# Patient Record
Sex: FEMALE | Race: OTHER | HISPANIC OR LATINO | URBAN - METROPOLITAN AREA
[De-identification: names, ages, dates, MRNs, and addresses within clinical notes are randomized per-mention and may not be internally consistent; named-entity substitution may affect disease eponyms.]

---

## 2021-05-16 ENCOUNTER — EMERGENCY (EMERGENCY)
Facility: HOSPITAL | Age: 21
LOS: 1 days | Discharge: HOME | End: 2021-05-16
Admitting: EMERGENCY MEDICINE
Payer: MEDICAID

## 2021-05-16 VITALS
HEART RATE: 111 BPM | OXYGEN SATURATION: 100 % | TEMPERATURE: 99 F | SYSTOLIC BLOOD PRESSURE: 117 MMHG | RESPIRATION RATE: 16 BRPM | DIASTOLIC BLOOD PRESSURE: 81 MMHG

## 2021-05-16 PROCEDURE — 99284 EMERGENCY DEPT VISIT MOD MDM: CPT

## 2021-05-16 RX ORDER — IBUPROFEN 200 MG
1 TABLET ORAL
Qty: 30 | Refills: 0
Start: 2021-05-16

## 2021-05-16 RX ORDER — KETOROLAC TROMETHAMINE 30 MG/ML
30 SYRINGE (ML) INJECTION ONCE
Refills: 0 | Status: DISCONTINUED | OUTPATIENT
Start: 2021-05-16 | End: 2021-05-16

## 2021-05-16 RX ORDER — CIPROFLOXACIN AND DEXAMETHASONE 3; 1 MG/ML; MG/ML
4 SUSPENSION/ DROPS AURICULAR (OTIC)
Qty: 1 | Refills: 0
Start: 2021-05-16 | End: 2021-05-22

## 2021-05-16 RX ADMIN — Medication 30 MILLIGRAM(S): at 16:32

## 2021-05-16 NOTE — ED PROVIDER NOTE - NSFOLLOWUPINSTRUCTIONS_ED_ALL_ED_FT
Otitis Externa    Otitis externa is a bacterial or fungal infection of the outer ear canal. This is the area from the eardrum to the outside of the ear. Otitis externa is sometimes called "swimmer's ear." Causes include swimming in dirty water, moisture remaining in ear after swimming or bathing, trauma to the ear, objects stuck in the ear, cuts or scrapes in our outside of the ear. Symptoms include itching, pain, swelling, redness in the ear canal, and fluid coming from the ear. To prevent recurrence of otitis media keep your ear dry, avoid scratching or putting objects inside your ear including cotton swabs, and avoid swimming in polluted water or poorly chlorinated pools.    SEEK MEDICAL CARE IF YOU HAVE THE FOLLOWING SYMPTOMS: fever, worsening symptoms, headache, redness/swelling/pain over the bone behind your ear.       Otitis Media    Otitis media is redness, soreness, and inflammation of the middle ear. Otitis media may be caused by allergies or, most commonly, by infection. Often it occurs as a complication of the common cold. Symptoms may include earache, fever, ringing in your ears, leakage of fluid from ear, hearing changes. If you were prescribed an antibiotic medicine, finish it all even if you start to feel better.     SEEK IMMEDIATE MEDICAL CARE IF YOU HAVE THE FOLLOWING SYMPTOMS: pain that is not controlled with medicine, swelling/redness/pain around your ear, facial paralysis, tenderness of the bone behind your ear when you touch it, neck lump or neck stiffness.

## 2021-05-16 NOTE — ED PROVIDER NOTE - PATIENT PORTAL LINK FT
You can access the FollowMyHealth Patient Portal offered by [facility] by registering at the following website: [email or web address]. By joining [facility]’s FollowMyHealth portal, you will also be able to view your health information using other applications (apps) compatible with our system.

## 2021-05-16 NOTE — ED PROVIDER NOTE - CLINICAL SUMMARY MEDICAL DECISION MAKING FREE TEXT BOX
Pt p/w L ear pain x 3 days. denies fever  unable to visualize TM 2/2 external canal inflammation and pain    will treat for otitis externa and media  -upreg, toradol IM, antibx sent to pharmacy

## 2021-05-16 NOTE — ED PROVIDER NOTE - OBJECTIVE STATEMENT
21 y/o F denies pmh c/o L ear ache x 3 days. Tried Tylenol without relief. Denies frequent ear infection, fever, chills, sore throat, cough, cp, sob, abd pain, n/v/d.